# Patient Record
Sex: FEMALE | Race: WHITE | NOT HISPANIC OR LATINO | Employment: OTHER | URBAN - NONMETROPOLITAN AREA
[De-identification: names, ages, dates, MRNs, and addresses within clinical notes are randomized per-mention and may not be internally consistent; named-entity substitution may affect disease eponyms.]

---

## 2017-07-13 ENCOUNTER — TRANSCRIBE ORDERS (OUTPATIENT)
Dept: ADMINISTRATIVE | Facility: HOSPITAL | Age: 77
End: 2017-07-13

## 2017-07-13 DIAGNOSIS — Z12.31 VISIT FOR SCREENING MAMMOGRAM: Primary | ICD-10-CM

## 2017-08-21 ENCOUNTER — HOSPITAL ENCOUNTER (OUTPATIENT)
Dept: MAMMOGRAPHY | Facility: HOSPITAL | Age: 77
Discharge: HOME | End: 2017-08-21
Attending: INTERNAL MEDICINE | Admitting: INTERNAL MEDICINE

## 2017-08-21 DIAGNOSIS — Z12.31 VISIT FOR SCREENING MAMMOGRAM: ICD-10-CM

## 2017-08-21 PROCEDURE — G0202 SCR MAMMO BI INCL CAD: HCPCS | Performed by: RADIOLOGY

## 2017-08-21 PROCEDURE — G0202 SCR MAMMO BI INCL CAD: HCPCS

## 2017-08-21 PROCEDURE — 77063 BREAST TOMOSYNTHESIS BI: CPT | Performed by: RADIOLOGY

## 2017-08-21 PROCEDURE — 77063 BREAST TOMOSYNTHESIS BI: CPT

## 2017-08-31 ENCOUNTER — TRANSCRIBE ORDERS (OUTPATIENT)
Dept: ADMINISTRATIVE | Facility: HOSPITAL | Age: 77
End: 2017-08-31

## 2017-08-31 DIAGNOSIS — M79.604 PAIN AND SWELLING OF RIGHT LOWER EXTREMITY: Primary | ICD-10-CM

## 2017-08-31 DIAGNOSIS — M79.89 PAIN AND SWELLING OF RIGHT LOWER EXTREMITY: Primary | ICD-10-CM

## 2017-09-01 ENCOUNTER — TRANSCRIBE ORDERS (OUTPATIENT)
Dept: ADMINISTRATIVE | Facility: HOSPITAL | Age: 77
End: 2017-09-01

## 2017-09-01 ENCOUNTER — APPOINTMENT (OUTPATIENT)
Dept: CARDIOLOGY | Facility: HOSPITAL | Age: 77
End: 2017-09-01

## 2017-09-01 DIAGNOSIS — R63.4 WEIGHT LOSS, UNINTENTIONAL: Primary | ICD-10-CM

## 2017-09-01 DIAGNOSIS — E03.9 ACQUIRED HYPOTHYROIDISM: ICD-10-CM

## 2017-09-14 ENCOUNTER — HOSPITAL ENCOUNTER (OUTPATIENT)
Dept: ULTRASOUND IMAGING | Facility: HOSPITAL | Age: 77
Discharge: HOME | End: 2017-09-14
Attending: INTERNAL MEDICINE | Admitting: INTERNAL MEDICINE

## 2017-09-14 DIAGNOSIS — E03.9 ACQUIRED HYPOTHYROIDISM: ICD-10-CM

## 2017-09-14 DIAGNOSIS — R63.4 WEIGHT LOSS, UNINTENTIONAL: ICD-10-CM

## 2017-09-14 PROCEDURE — 76536 US EXAM OF HEAD AND NECK: CPT

## 2017-09-14 PROCEDURE — 76536 US EXAM OF HEAD AND NECK: CPT | Performed by: RADIOLOGY

## 2017-11-06 ENCOUNTER — TRANSCRIBE ORDERS (OUTPATIENT)
Dept: ADMINISTRATIVE | Facility: HOSPITAL | Age: 77
End: 2017-11-06

## 2017-11-06 DIAGNOSIS — I82.890 JUGULAR VEIN THROMBOSIS, LEFT: Primary | ICD-10-CM

## 2017-11-06 DIAGNOSIS — I82.890 JUGULAR VEIN THROMBOSIS, LEFT: ICD-10-CM

## 2017-11-06 DIAGNOSIS — R74.8 ELEVATED CREATINE KINASE: Primary | ICD-10-CM

## 2017-11-30 ENCOUNTER — HOSPITAL ENCOUNTER (OUTPATIENT)
Dept: CARDIOLOGY | Facility: HOSPITAL | Age: 77
Discharge: HOME | End: 2017-11-30
Attending: INTERNAL MEDICINE

## 2017-11-30 ENCOUNTER — HOSPITAL ENCOUNTER (OUTPATIENT)
Dept: ULTRASOUND IMAGING | Facility: HOSPITAL | Age: 77
Discharge: HOME | End: 2017-11-30
Attending: INTERNAL MEDICINE | Admitting: INTERNAL MEDICINE

## 2017-11-30 DIAGNOSIS — I82.890 JUGULAR VEIN THROMBOSIS, LEFT: ICD-10-CM

## 2017-11-30 DIAGNOSIS — R74.8 ELEVATED CREATINE KINASE: ICD-10-CM

## 2017-11-30 PROCEDURE — 76770 US EXAM ABDO BACK WALL COMP: CPT | Performed by: RADIOLOGY

## 2017-11-30 PROCEDURE — 93971 EXTREMITY STUDY: CPT | Performed by: RADIOLOGY

## 2017-11-30 PROCEDURE — 93971 EXTREMITY STUDY: CPT

## 2017-11-30 PROCEDURE — 76775 US EXAM ABDO BACK WALL LIM: CPT

## 2017-12-26 ENCOUNTER — TRANSCRIBE ORDERS (OUTPATIENT)
Dept: ADMINISTRATIVE | Facility: HOSPITAL | Age: 77
End: 2017-12-26

## 2017-12-26 DIAGNOSIS — I73.9 INTERMITTENT CLAUDICATION (HCC): Primary | ICD-10-CM

## 2018-01-05 ENCOUNTER — HOSPITAL ENCOUNTER (OUTPATIENT)
Dept: ULTRASOUND IMAGING | Facility: HOSPITAL | Age: 78
Discharge: HOME | End: 2018-01-05
Attending: INTERNAL MEDICINE | Admitting: INTERNAL MEDICINE

## 2018-01-05 DIAGNOSIS — I73.9 INTERMITTENT CLAUDICATION (HCC): ICD-10-CM

## 2018-01-05 PROCEDURE — 93925 LOWER EXTREMITY STUDY: CPT | Performed by: RADIOLOGY

## 2018-01-05 PROCEDURE — 93923 UPR/LXTR ART STDY 3+ LVLS: CPT

## 2018-09-14 ENCOUNTER — HOSPITAL ENCOUNTER (OUTPATIENT)
Dept: MAMMOGRAPHY | Facility: HOSPITAL | Age: 78
Discharge: HOME | End: 2018-09-14
Admitting: INTERNAL MEDICINE

## 2018-09-14 DIAGNOSIS — Z12.39 SCREENING BREAST EXAMINATION: ICD-10-CM

## 2018-09-14 PROCEDURE — 77063 BREAST TOMOSYNTHESIS BI: CPT

## 2018-09-14 PROCEDURE — 77063 BREAST TOMOSYNTHESIS BI: CPT | Performed by: RADIOLOGY

## 2018-09-14 PROCEDURE — 77067 SCR MAMMO BI INCL CAD: CPT | Performed by: RADIOLOGY

## 2018-09-14 PROCEDURE — 77067 SCR MAMMO BI INCL CAD: CPT

## 2019-09-18 ENCOUNTER — HOSPITAL ENCOUNTER (OUTPATIENT)
Dept: MAMMOGRAPHY | Facility: HOSPITAL | Age: 79
Discharge: HOME | End: 2019-09-18
Admitting: INTERNAL MEDICINE

## 2019-09-18 DIAGNOSIS — Z12.39 SCREENING BREAST EXAMINATION: ICD-10-CM

## 2019-09-18 PROCEDURE — 77063 BREAST TOMOSYNTHESIS BI: CPT | Performed by: RADIOLOGY

## 2019-09-18 PROCEDURE — 77067 SCR MAMMO BI INCL CAD: CPT

## 2019-09-18 PROCEDURE — 77063 BREAST TOMOSYNTHESIS BI: CPT

## 2019-09-18 PROCEDURE — 77067 SCR MAMMO BI INCL CAD: CPT | Performed by: RADIOLOGY

## 2020-10-27 ENCOUNTER — HOSPITAL ENCOUNTER (OUTPATIENT)
Dept: MAMMOGRAPHY | Facility: HOSPITAL | Age: 80
Discharge: HOME | End: 2020-10-27
Admitting: INTERNAL MEDICINE

## 2020-10-27 DIAGNOSIS — Z12.31 VISIT FOR SCREENING MAMMOGRAM: ICD-10-CM

## 2020-10-27 PROCEDURE — 77063 BREAST TOMOSYNTHESIS BI: CPT | Performed by: RADIOLOGY

## 2020-10-27 PROCEDURE — 77063 BREAST TOMOSYNTHESIS BI: CPT

## 2020-10-27 PROCEDURE — 77067 SCR MAMMO BI INCL CAD: CPT

## 2020-10-27 PROCEDURE — 77067 SCR MAMMO BI INCL CAD: CPT | Performed by: RADIOLOGY

## 2021-01-11 ENCOUNTER — IMMUNIZATION (OUTPATIENT)
Dept: VACCINE CLINIC | Facility: HOSPITAL | Age: 81
End: 2021-01-11

## 2021-01-11 PROCEDURE — 0001A: CPT | Performed by: FAMILY MEDICINE

## 2021-01-11 PROCEDURE — 91300 HC SARSCOV02 VAC 30MCG/0.3ML IM: CPT | Performed by: FAMILY MEDICINE

## 2021-02-01 ENCOUNTER — IMMUNIZATION (OUTPATIENT)
Dept: VACCINE CLINIC | Facility: HOSPITAL | Age: 81
End: 2021-02-01

## 2021-02-01 PROCEDURE — 91300 HC SARSCOV02 VAC 30MCG/0.3ML IM: CPT | Performed by: FAMILY MEDICINE

## 2021-02-01 PROCEDURE — 0002A: CPT | Performed by: FAMILY MEDICINE

## 2021-06-08 ENCOUNTER — TRANSCRIBE ORDERS (OUTPATIENT)
Dept: ADMINISTRATIVE | Facility: HOSPITAL | Age: 81
End: 2021-06-08

## 2021-06-08 DIAGNOSIS — I73.9 PERIPHERAL VASCULAR DISEASE, UNSPECIFIED (HCC): Primary | ICD-10-CM

## 2021-06-18 ENCOUNTER — HOSPITAL ENCOUNTER (OUTPATIENT)
Dept: CARDIOLOGY | Facility: HOSPITAL | Age: 81
Discharge: HOME | End: 2021-06-18
Admitting: INTERNAL MEDICINE

## 2021-06-18 DIAGNOSIS — I73.9 PERIPHERAL VASCULAR DISEASE, UNSPECIFIED (HCC): ICD-10-CM

## 2021-06-18 PROCEDURE — 93925 LOWER EXTREMITY STUDY: CPT

## 2021-06-18 PROCEDURE — 93925 LOWER EXTREMITY STUDY: CPT | Performed by: RADIOLOGY

## 2021-08-24 ENCOUNTER — OFFICE VISIT (OUTPATIENT)
Dept: CARDIOLOGY | Facility: CLINIC | Age: 81
End: 2021-08-24

## 2021-08-24 VITALS
WEIGHT: 136.8 LBS | HEART RATE: 60 BPM | BODY MASS INDEX: 21.98 KG/M2 | SYSTOLIC BLOOD PRESSURE: 148 MMHG | HEIGHT: 66 IN | DIASTOLIC BLOOD PRESSURE: 68 MMHG | OXYGEN SATURATION: 96 %

## 2021-08-24 DIAGNOSIS — E78.5 DYSLIPIDEMIA: ICD-10-CM

## 2021-08-24 DIAGNOSIS — M79.604 PAIN IN BOTH LOWER EXTREMITIES: Primary | ICD-10-CM

## 2021-08-24 DIAGNOSIS — I25.10 ASCVD (ARTERIOSCLEROTIC CARDIOVASCULAR DISEASE): ICD-10-CM

## 2021-08-24 DIAGNOSIS — M79.605 PAIN IN BOTH LOWER EXTREMITIES: Primary | ICD-10-CM

## 2021-08-24 DIAGNOSIS — I10 ESSENTIAL HYPERTENSION: ICD-10-CM

## 2021-08-24 DIAGNOSIS — I70.212 ATHEROSCLEROSIS OF NATIVE ARTERY OF LEFT LOWER EXTREMITY WITH INTERMITTENT CLAUDICATION (HCC): ICD-10-CM

## 2021-08-24 PROCEDURE — 99204 OFFICE O/P NEW MOD 45 MIN: CPT | Performed by: INTERNAL MEDICINE

## 2021-08-24 RX ORDER — TRAZODONE HYDROCHLORIDE 100 MG/1
100 TABLET ORAL DAILY
COMMUNITY
Start: 2021-06-28

## 2021-08-24 RX ORDER — CILOSTAZOL 50 MG/1
50 TABLET ORAL 2 TIMES DAILY
Qty: 60 TABLET | Refills: 1 | Status: SHIPPED | OUTPATIENT
Start: 2021-08-24 | End: 2021-08-24 | Stop reason: SDUPTHER

## 2021-08-24 RX ORDER — CILOSTAZOL 50 MG/1
50 TABLET ORAL 2 TIMES DAILY
Qty: 180 TABLET | Refills: 3 | Status: SHIPPED | OUTPATIENT
Start: 2021-08-24

## 2021-08-24 RX ORDER — LEVOTHYROXINE SODIUM 88 UG/1
88 TABLET ORAL DAILY
COMMUNITY
Start: 2021-06-22

## 2021-08-24 RX ORDER — PRAVASTATIN SODIUM 80 MG/1
80 TABLET ORAL DAILY
COMMUNITY
Start: 2021-06-28

## 2021-08-24 RX ORDER — ESTRADIOL 0.5 MG/1
0.5 TABLET ORAL DAILY
COMMUNITY
Start: 2014-05-09

## 2021-08-24 RX ORDER — HYDRALAZINE HYDROCHLORIDE 25 MG/1
25 TABLET, FILM COATED ORAL DAILY
COMMUNITY
Start: 2021-06-28

## 2021-08-24 RX ORDER — ASPIRIN 81 MG/1
81 TABLET ORAL DAILY
COMMUNITY

## 2021-08-24 RX ORDER — AMLODIPINE BESYLATE 5 MG/1
5 TABLET ORAL DAILY
COMMUNITY
Start: 2021-06-30

## 2021-08-24 NOTE — PROGRESS NOTES
"Chief Complaint  Consult (leg pain)    Subjective         Pamela Acosta presents to [facility] for consult PAD. Referred by Dr. Anaya  History of Present Illness   Presents with lower extremity pain. No edema noted. Onset was several years ago.  No color changes Intermittent claudication with rest pain bilateral lower extremity. Left worse that the right. Only able to walk about 100 feet then has to take a break before continuing.   No chest pain, shortness of breath, dizziness, or palpitations. She has a past medical history significant for dyslipidemia, hypertension, and hypothyroidism. Smoking: Former smoker. Quit around 3 years ago.     Objective     Vital Signs:   /68 (BP Location: Right arm, Patient Position: Sitting)   Pulse 60   Ht 167.6 cm (66\")   Wt 62.1 kg (136 lb 12.8 oz)   SpO2 96%   BMI 22.08 kg/m²       Physical Exam  Constitutional:       General: She is not in acute distress.     Appearance: Normal appearance.   HENT:      Head: Normocephalic.      Nose: Nose normal.      Mouth/Throat:      Pharynx: Oropharynx is clear.   Eyes:      Pupils: Pupils are equal, round, and reactive to light.   Neck:      Vascular: No carotid bruit.   Cardiovascular:      Rate and Rhythm: Normal rate and regular rhythm.      Pulses: Normal pulses.      Heart sounds: Normal heart sounds. No murmur heard.     Pulmonary:      Effort: Pulmonary effort is normal.      Breath sounds: Normal breath sounds.   Abdominal:      General: Bowel sounds are normal.      Palpations: Abdomen is soft.   Musculoskeletal:      Cervical back: Neck supple.      Right lower leg: No edema.      Left lower leg: No edema.   Skin:     General: Skin is warm.   Neurological:      General: No focal deficit present.      Mental Status: She is alert and oriented to person, place, and time.   Psychiatric:         Mood and Affect: Mood normal.         Behavior: Behavior normal.         Judgment: Judgment " normal.          Result Review :  Data reviewed: Cardiology studies 08/28/2021       Assessment/Plan  Bilateral leg pain  Atherosclerosis of native artery left lower extremity with intermittent claudications  Essential hypertension  Dyslipidemia  ASCVD    AA runoff, Nuclear stress test, echocardiogram.   Peripheral angiogram left 9-8-2021  Pletal 50 mg BID.  Follow up post procedure    I have explained the risks associated with the procedure to the patient including but not limited to an allergic reaction to the contrast material or medications used during the procedure bleeding, infection, and bruising at the catheter insertion site blood clots, which may trigger heart attack, stroke,   damage to the artery where the catheter was inserted, or damage to the arteries as the catheter travels through your body, irregular heart rhythm arrhythmias, kidney damage caused by the contrast material.             Problem List Items Addressed This Visit     None      Visit Diagnoses     Pain in both lower extremities    -  Primary    Relevant Orders    CT Angio Abdominal Aorta Bilateral Iliofem Runoff    US Ankle / Brachial Indices Extremity Limited    Adult Transthoracic Echo Complete W/ Cont if Necessary Per Protocol    Stress Test With Myocardial Perfusion One Day    Case Request IR Lab: Peripheral angiography    CBC & Differential    Comprehensive Metabolic Panel    Atherosclerosis of native artery of left lower extremity with intermittent claudication (CMS/HCC)        Relevant Orders    CT Angio Abdominal Aorta Bilateral Iliofem Runoff    US Ankle / Brachial Indices Extremity Limited    Adult Transthoracic Echo Complete W/ Cont if Necessary Per Protocol    Stress Test With Myocardial Perfusion One Day    Case Request IR Lab: Peripheral angiography    CBC & Differential    Comprehensive Metabolic Panel    ASCVD (arteriosclerotic cardiovascular disease)        Relevant Orders    CT Angio Abdominal Aorta Bilateral Iliofem  Runoff    US Ankle / Brachial Indices Extremity Limited    Adult Transthoracic Echo Complete W/ Cont if Necessary Per Protocol    Stress Test With Myocardial Perfusion One Day    Case Request IR Lab: Peripheral angiography    CBC & Differential    Comprehensive Metabolic Panel    Essential hypertension        Relevant Medications    amLODIPine (NORVASC) 5 MG tablet    hydrALAZINE (APRESOLINE) 25 MG tablet    Other Relevant Orders    CT Angio Abdominal Aorta Bilateral Iliofem Runoff    US Ankle / Brachial Indices Extremity Limited    Adult Transthoracic Echo Complete W/ Cont if Necessary Per Protocol    Stress Test With Myocardial Perfusion One Day    Case Request IR Lab: Peripheral angiography    CBC & Differential    Comprehensive Metabolic Panel    Dyslipidemia                Follow Up     Return for Post procedure.  Patient was given instructions and counseling regarding her condition or for health maintenance advice. Please see specific information pulled into the AVS if appropriate.               Sandip Morris MD, FACC  Interventional Cardiology    TICO Tabares, acting as scribe for Sandip Morris MD, [facility]    08/24/21  17:54 EDT

## 2021-08-25 ENCOUNTER — TRANSCRIBE ORDERS (OUTPATIENT)
Dept: ADMINISTRATIVE | Facility: HOSPITAL | Age: 81
End: 2021-08-25

## 2021-08-25 DIAGNOSIS — Z01.818 OTHER SPECIFIED PRE-OPERATIVE EXAMINATION: Primary | ICD-10-CM

## 2021-08-25 PROBLEM — I10 ESSENTIAL HYPERTENSION: Status: ACTIVE | Noted: 2021-08-25

## 2021-08-25 PROBLEM — I25.10 ASCVD (ARTERIOSCLEROTIC CARDIOVASCULAR DISEASE): Status: ACTIVE | Noted: 2021-08-25

## 2021-08-25 PROBLEM — M79.605 PAIN IN BOTH LOWER EXTREMITIES: Status: ACTIVE | Noted: 2021-08-25

## 2021-08-25 PROBLEM — I70.212 ATHEROSCLEROSIS OF NATIVE ARTERY OF LEFT LOWER EXTREMITY WITH INTERMITTENT CLAUDICATION (HCC): Status: ACTIVE | Noted: 2021-08-25

## 2021-08-25 PROBLEM — M79.604 PAIN IN BOTH LOWER EXTREMITIES: Status: ACTIVE | Noted: 2021-08-25

## 2021-08-26 ENCOUNTER — TELEPHONE (OUTPATIENT)
Dept: CARDIOLOGY | Facility: CLINIC | Age: 81
End: 2021-08-26

## 2021-08-26 NOTE — TELEPHONE ENCOUNTER
Spoke with patient.  She wants to hold off for now with peripheral angiogram due to the COVID. Will let us know when she is ready to schedule

## 2021-10-18 ENCOUNTER — HOSPITAL ENCOUNTER (OUTPATIENT)
Dept: NUCLEAR MEDICINE | Facility: HOSPITAL | Age: 81
Discharge: HOME | End: 2021-10-18

## 2021-10-18 ENCOUNTER — HOSPITAL ENCOUNTER (OUTPATIENT)
Dept: CARDIOLOGY | Facility: HOSPITAL | Age: 81
Discharge: HOME | End: 2021-10-18

## 2021-10-18 DIAGNOSIS — I10 ESSENTIAL HYPERTENSION: ICD-10-CM

## 2021-10-18 DIAGNOSIS — M79.605 PAIN IN BOTH LOWER EXTREMITIES: ICD-10-CM

## 2021-10-18 DIAGNOSIS — I70.212 ATHEROSCLEROSIS OF NATIVE ARTERY OF LEFT LOWER EXTREMITY WITH INTERMITTENT CLAUDICATION (HCC): ICD-10-CM

## 2021-10-18 DIAGNOSIS — M79.604 PAIN IN BOTH LOWER EXTREMITIES: ICD-10-CM

## 2021-10-18 DIAGNOSIS — I25.10 ASCVD (ARTERIOSCLEROTIC CARDIOVASCULAR DISEASE): ICD-10-CM

## 2021-10-18 PROCEDURE — A9500 TC99M SESTAMIBI: HCPCS | Performed by: INTERNAL MEDICINE

## 2021-10-18 PROCEDURE — 25010000002 REGADENOSON 0.4 MG/5ML SOLUTION: Performed by: INTERNAL MEDICINE

## 2021-10-18 PROCEDURE — 93306 TTE W/DOPPLER COMPLETE: CPT | Performed by: INTERNAL MEDICINE

## 2021-10-18 PROCEDURE — 93306 TTE W/DOPPLER COMPLETE: CPT

## 2021-10-18 PROCEDURE — 0 TECHNETIUM SESTAMIBI: Performed by: INTERNAL MEDICINE

## 2021-10-18 PROCEDURE — 93018 CV STRESS TEST I&R ONLY: CPT | Performed by: INTERNAL MEDICINE

## 2021-10-18 PROCEDURE — 93017 CV STRESS TEST TRACING ONLY: CPT

## 2021-10-18 PROCEDURE — 78452 HT MUSCLE IMAGE SPECT MULT: CPT

## 2021-10-18 PROCEDURE — 78452 HT MUSCLE IMAGE SPECT MULT: CPT | Performed by: INTERNAL MEDICINE

## 2021-10-18 RX ADMIN — TECHNETIUM TC 99M SESTAMIBI 1 DOSE: 1 INJECTION INTRAVENOUS at 15:58

## 2021-10-18 RX ADMIN — REGADENOSON 0.4 MG: 0.08 INJECTION, SOLUTION INTRAVENOUS at 15:58

## 2021-10-18 RX ADMIN — TECHNETIUM TC 99M SESTAMIBI 1 DOSE: 1 INJECTION INTRAVENOUS at 14:00

## 2021-10-19 LAB
BH CV ECHO MEAS - ACS: 1.7 CM
BH CV ECHO MEAS - AO MAX PG (FULL): 3.5 MMHG
BH CV ECHO MEAS - AO MAX PG: 8.1 MMHG
BH CV ECHO MEAS - AO MEAN PG (FULL): 2 MMHG
BH CV ECHO MEAS - AO MEAN PG: 4 MMHG
BH CV ECHO MEAS - AO ROOT AREA (BSA CORRECTED): 1.7
BH CV ECHO MEAS - AO ROOT AREA: 6.6 CM^2
BH CV ECHO MEAS - AO ROOT DIAM: 2.9 CM
BH CV ECHO MEAS - AO V2 MAX: 142 CM/SEC
BH CV ECHO MEAS - AO V2 MEAN: 90.3 CM/SEC
BH CV ECHO MEAS - AO V2 VTI: 33.6 CM
BH CV ECHO MEAS - ASC AORTA: 3.1 CM
BH CV ECHO MEAS - AVA(I,A): 1.7 CM^2
BH CV ECHO MEAS - AVA(I,D): 1.7 CM^2
BH CV ECHO MEAS - AVA(V,A): 1.9 CM^2
BH CV ECHO MEAS - AVA(V,D): 1.9 CM^2
BH CV ECHO MEAS - BSA(HAYCOCK): 1.7 M^2
BH CV ECHO MEAS - BSA: 1.7 M^2
BH CV ECHO MEAS - BZI_BMI: 22 KILOGRAMS/M^2
BH CV ECHO MEAS - BZI_METRIC_HEIGHT: 167.6 CM
BH CV ECHO MEAS - BZI_METRIC_WEIGHT: 61.7 KG
BH CV ECHO MEAS - EDV(CUBED): 64 ML
BH CV ECHO MEAS - EDV(MOD-SP4): 54.6 ML
BH CV ECHO MEAS - EDV(TEICH): 70 ML
BH CV ECHO MEAS - EF(CUBED): 83.4 %
BH CV ECHO MEAS - EF(MOD-SP4): 71.2 %
BH CV ECHO MEAS - EF(TEICH): 76.9 %
BH CV ECHO MEAS - ESV(CUBED): 10.6 ML
BH CV ECHO MEAS - ESV(MOD-SP4): 15.7 ML
BH CV ECHO MEAS - ESV(TEICH): 16.2 ML
BH CV ECHO MEAS - FS: 45 %
BH CV ECHO MEAS - IVS/LVPW: 1.1
BH CV ECHO MEAS - IVSD: 1 CM
BH CV ECHO MEAS - LA DIMENSION: 3.2 CM
BH CV ECHO MEAS - LA/AO: 1.1
BH CV ECHO MEAS - LV DIASTOLIC VOL/BSA (35-75): 32.2 ML/M^2
BH CV ECHO MEAS - LV MASS(C)D: 118.2 GRAMS
BH CV ECHO MEAS - LV MASS(C)DI: 69.6 GRAMS/M^2
BH CV ECHO MEAS - LV MAX PG: 4.6 MMHG
BH CV ECHO MEAS - LV MEAN PG: 2 MMHG
BH CV ECHO MEAS - LV SYSTOLIC VOL/BSA (12-30): 9.2 ML/M^2
BH CV ECHO MEAS - LV V1 MAX: 107 CM/SEC
BH CV ECHO MEAS - LV V1 MEAN: 64.5 CM/SEC
BH CV ECHO MEAS - LV V1 VTI: 22.9 CM
BH CV ECHO MEAS - LVIDD: 4 CM
BH CV ECHO MEAS - LVIDS: 2.2 CM
BH CV ECHO MEAS - LVLD AP4: 6.5 CM
BH CV ECHO MEAS - LVLS AP4: 5.1 CM
BH CV ECHO MEAS - LVOT AREA (M): 2.5 CM^2
BH CV ECHO MEAS - LVOT AREA: 2.5 CM^2
BH CV ECHO MEAS - LVOT DIAM: 1.8 CM
BH CV ECHO MEAS - LVPWD: 0.9 CM
BH CV ECHO MEAS - MV A MAX VEL: 89.7 CM/SEC
BH CV ECHO MEAS - MV DEC SLOPE: 465 CM/SEC^2
BH CV ECHO MEAS - MV E MAX VEL: 110 CM/SEC
BH CV ECHO MEAS - MV E/A: 1.2
BH CV ECHO MEAS - MV P1/2T MAX VEL: 110 CM/SEC
BH CV ECHO MEAS - MV P1/2T: 69.3 MSEC
BH CV ECHO MEAS - MVA P1/2T LCG: 2 CM^2
BH CV ECHO MEAS - MVA(P1/2T): 3.2 CM^2
BH CV ECHO MEAS - PA ACC TIME: 0.14 SEC
BH CV ECHO MEAS - PA PR(ACCEL): 17.4 MMHG
BH CV ECHO MEAS - SI(AO): 130.7 ML/M^2
BH CV ECHO MEAS - SI(CUBED): 31.4 ML/M^2
BH CV ECHO MEAS - SI(LVOT): 34.3 ML/M^2
BH CV ECHO MEAS - SI(MOD-SP4): 22.9 ML/M^2
BH CV ECHO MEAS - SI(TEICH): 31.7 ML/M^2
BH CV ECHO MEAS - SV(AO): 221.9 ML
BH CV ECHO MEAS - SV(CUBED): 53.4 ML
BH CV ECHO MEAS - SV(LVOT): 58.3 ML
BH CV ECHO MEAS - SV(MOD-SP4): 38.9 ML
BH CV ECHO MEAS - SV(TEICH): 53.8 ML
BH CV ECHO MEAS - TR MAX VEL: 241 CM/SEC
BH CV NUCLEAR PRIOR STUDY: 3
BH CV REST NUCLEAR ISOTOPE DOSE: 9.1 MCI
BH CV STRESS BP STAGE 1: NORMAL
BH CV STRESS BP STAGE 2: NORMAL
BH CV STRESS COMMENTS STAGE 1: NORMAL
BH CV STRESS COMMENTS STAGE 2: NORMAL
BH CV STRESS DOSE REGADENOSON STAGE 1: 0.4
BH CV STRESS DURATION MIN STAGE 1: 0
BH CV STRESS DURATION MIN STAGE 2: 4
BH CV STRESS DURATION SEC STAGE 1: 10
BH CV STRESS DURATION SEC STAGE 2: 0
BH CV STRESS HR STAGE 1: 75
BH CV STRESS HR STAGE 2: 83
BH CV STRESS NUCLEAR ISOTOPE DOSE: 27.2 MCI
BH CV STRESS PROTOCOL 1: NORMAL
BH CV STRESS RECOVERY BP: NORMAL MMHG
BH CV STRESS RECOVERY HR: 74 BPM
BH CV STRESS STAGE 1: 1
BH CV STRESS STAGE 2: 2
MAXIMAL PREDICTED HEART RATE: 141 BPM
MAXIMAL PREDICTED HEART RATE: 141 BPM
PERCENT MAX PREDICTED HR: 58.87 %
STRESS BASELINE BP: NORMAL MMHG
STRESS BASELINE HR: 67 BPM
STRESS PERCENT HR: 69 %
STRESS POST PEAK BP: NORMAL MMHG
STRESS POST PEAK HR: 83 BPM
STRESS TARGET HR: 120 BPM
STRESS TARGET HR: 120 BPM

## 2021-10-21 ENCOUNTER — TELEPHONE (OUTPATIENT)
Dept: CARDIOLOGY | Facility: CLINIC | Age: 81
End: 2021-10-21

## 2021-10-21 NOTE — TELEPHONE ENCOUNTER
"Spoke with patient notified that echocardiogram was normal.  Patient doesn't want to proceed with peripheral angiogram due to kidney function. Her PCP told her to never have any \"dye studies\"    Will call her back with stress test results once they are available.   "

## 2021-10-25 ENCOUNTER — APPOINTMENT (OUTPATIENT)
Dept: CT IMAGING | Facility: HOSPITAL | Age: 81
End: 2021-10-25

## 2021-10-25 ENCOUNTER — APPOINTMENT (OUTPATIENT)
Dept: ULTRASOUND IMAGING | Facility: HOSPITAL | Age: 81
End: 2021-10-25

## 2021-11-01 ENCOUNTER — IMMUNIZATION (OUTPATIENT)
Dept: VACCINE CLINIC | Facility: HOSPITAL | Age: 81
End: 2021-11-01

## 2021-11-01 PROCEDURE — 0004A ADM SARSCOV2 30MCG/0.3ML BOOSTER: CPT | Performed by: INTERNAL MEDICINE

## 2021-11-01 PROCEDURE — 91300 HC SARSCOV02 VAC 30MCG/0.3ML IM: CPT | Performed by: INTERNAL MEDICINE

## 2021-11-15 ENCOUNTER — HOSPITAL ENCOUNTER (OUTPATIENT)
Dept: MAMMOGRAPHY | Facility: HOSPITAL | Age: 81
Discharge: HOME | End: 2021-11-15
Admitting: INTERNAL MEDICINE

## 2021-11-15 DIAGNOSIS — Z12.31 VISIT FOR SCREENING MAMMOGRAM: ICD-10-CM

## 2021-11-15 PROCEDURE — 77063 BREAST TOMOSYNTHESIS BI: CPT

## 2021-11-15 PROCEDURE — 77063 BREAST TOMOSYNTHESIS BI: CPT | Performed by: RADIOLOGY

## 2021-11-15 PROCEDURE — 77067 SCR MAMMO BI INCL CAD: CPT

## 2021-11-15 PROCEDURE — 77067 SCR MAMMO BI INCL CAD: CPT | Performed by: RADIOLOGY

## 2021-12-15 ENCOUNTER — OFFICE VISIT (OUTPATIENT)
Dept: CARDIOLOGY | Facility: CLINIC | Age: 81
End: 2021-12-15

## 2021-12-15 VITALS
OXYGEN SATURATION: 95 % | HEART RATE: 74 BPM | BODY MASS INDEX: 21.86 KG/M2 | DIASTOLIC BLOOD PRESSURE: 75 MMHG | SYSTOLIC BLOOD PRESSURE: 126 MMHG | HEIGHT: 66 IN | WEIGHT: 136 LBS

## 2021-12-15 DIAGNOSIS — I73.9 PERIPHERAL ARTERY DISEASE: Primary | ICD-10-CM

## 2021-12-15 PROCEDURE — 99214 OFFICE O/P EST MOD 30 MIN: CPT | Performed by: INTERNAL MEDICINE

## 2022-01-03 ENCOUNTER — APPOINTMENT (OUTPATIENT)
Dept: GENERAL RADIOLOGY | Facility: HOSPITAL | Age: 82
End: 2022-01-03

## 2022-01-10 ENCOUNTER — HOSPITAL ENCOUNTER (OUTPATIENT)
Dept: ULTRASOUND IMAGING | Facility: HOSPITAL | Age: 82
Discharge: HOME | End: 2022-01-10
Admitting: INTERNAL MEDICINE

## 2022-01-10 PROCEDURE — 93922 UPR/L XTREMITY ART 2 LEVELS: CPT

## 2022-01-10 PROCEDURE — 93922 UPR/L XTREMITY ART 2 LEVELS: CPT | Performed by: RADIOLOGY

## 2022-01-21 ENCOUNTER — TELEPHONE (OUTPATIENT)
Dept: CARDIOLOGY | Facility: CLINIC | Age: 82
End: 2022-01-21

## 2022-01-21 NOTE — TELEPHONE ENCOUNTER
----- Message from Sandip Morris MD sent at 1/11/2022  9:40 AM EST -----  Please call the patient and tell her TYLER was mildly lower on R side and I will discuss more during next visit, if she is not having worsening pain on R leg there is no urgent need for procedure now

## 2022-06-28 ENCOUNTER — OFFICE VISIT (OUTPATIENT)
Dept: CARDIOLOGY | Facility: CLINIC | Age: 82
End: 2022-06-28

## 2022-06-28 VITALS
DIASTOLIC BLOOD PRESSURE: 67 MMHG | WEIGHT: 134.6 LBS | OXYGEN SATURATION: 97 % | SYSTOLIC BLOOD PRESSURE: 139 MMHG | BODY MASS INDEX: 21.63 KG/M2 | HEIGHT: 66 IN | HEART RATE: 64 BPM

## 2022-06-28 DIAGNOSIS — I10 ESSENTIAL HYPERTENSION: ICD-10-CM

## 2022-06-28 DIAGNOSIS — I25.10 ASCVD (ARTERIOSCLEROTIC CARDIOVASCULAR DISEASE): Primary | ICD-10-CM

## 2022-06-28 PROCEDURE — 99214 OFFICE O/P EST MOD 30 MIN: CPT | Performed by: INTERNAL MEDICINE

## 2022-10-13 ENCOUNTER — TRANSCRIBE ORDERS (OUTPATIENT)
Dept: ADMINISTRATIVE | Facility: HOSPITAL | Age: 82
End: 2022-10-13

## 2022-10-13 ENCOUNTER — HOSPITAL ENCOUNTER (OUTPATIENT)
Dept: GENERAL RADIOLOGY | Facility: HOSPITAL | Age: 82
Discharge: HOME | End: 2022-10-13
Admitting: INTERNAL MEDICINE

## 2022-10-13 DIAGNOSIS — J20.9 ACUTE BRONCHITIS, UNSPECIFIED ORGANISM: Primary | ICD-10-CM

## 2022-10-13 DIAGNOSIS — J20.9 ACUTE BRONCHITIS, UNSPECIFIED ORGANISM: ICD-10-CM

## 2022-10-13 PROCEDURE — 71046 X-RAY EXAM CHEST 2 VIEWS: CPT

## 2022-10-13 PROCEDURE — 71046 X-RAY EXAM CHEST 2 VIEWS: CPT | Performed by: RADIOLOGY

## 2022-11-18 ENCOUNTER — APPOINTMENT (OUTPATIENT)
Dept: MAMMOGRAPHY | Facility: HOSPITAL | Age: 82
End: 2022-11-18

## 2022-11-28 ENCOUNTER — HOSPITAL ENCOUNTER (OUTPATIENT)
Dept: MAMMOGRAPHY | Facility: HOSPITAL | Age: 82
Discharge: HOME | End: 2022-11-28
Admitting: INTERNAL MEDICINE

## 2022-11-28 DIAGNOSIS — Z12.31 VISIT FOR SCREENING MAMMOGRAM: ICD-10-CM

## 2022-11-28 PROCEDURE — 77063 BREAST TOMOSYNTHESIS BI: CPT | Performed by: RADIOLOGY

## 2022-11-28 PROCEDURE — 77063 BREAST TOMOSYNTHESIS BI: CPT

## 2022-11-28 PROCEDURE — 77067 SCR MAMMO BI INCL CAD: CPT

## 2022-11-28 PROCEDURE — 77067 SCR MAMMO BI INCL CAD: CPT | Performed by: RADIOLOGY

## 2023-01-04 ENCOUNTER — HOSPITAL ENCOUNTER (OUTPATIENT)
Dept: MAMMOGRAPHY | Facility: HOSPITAL | Age: 83
Discharge: HOME | End: 2023-01-04
Payer: MEDICARE

## 2023-01-04 ENCOUNTER — HOSPITAL ENCOUNTER (OUTPATIENT)
Dept: ULTRASOUND IMAGING | Facility: HOSPITAL | Age: 83
Discharge: HOME | End: 2023-01-04
Payer: MEDICARE

## 2023-01-04 DIAGNOSIS — R92.8 ABNORMAL MAMMOGRAM: ICD-10-CM

## 2023-01-04 PROCEDURE — 76642 ULTRASOUND BREAST LIMITED: CPT

## 2023-01-04 PROCEDURE — 76642 ULTRASOUND BREAST LIMITED: CPT | Performed by: RADIOLOGY

## 2023-04-10 ENCOUNTER — TRANSCRIBE ORDERS (OUTPATIENT)
Dept: ADMINISTRATIVE | Facility: HOSPITAL | Age: 83
End: 2023-04-10
Payer: MEDICARE

## 2023-04-10 ENCOUNTER — HOSPITAL ENCOUNTER (OUTPATIENT)
Dept: GENERAL RADIOLOGY | Facility: HOSPITAL | Age: 83
Discharge: HOME | End: 2023-04-10
Admitting: INTERNAL MEDICINE
Payer: MEDICARE

## 2023-04-10 DIAGNOSIS — M54.2 NECK PAIN: Primary | ICD-10-CM

## 2023-04-10 DIAGNOSIS — M54.2 NECK PAIN: ICD-10-CM

## 2023-04-10 PROCEDURE — 72050 X-RAY EXAM NECK SPINE 4/5VWS: CPT

## 2023-04-10 PROCEDURE — 72050 X-RAY EXAM NECK SPINE 4/5VWS: CPT | Performed by: RADIOLOGY

## 2023-04-13 ENCOUNTER — TRANSCRIBE ORDERS (OUTPATIENT)
Dept: ADMINISTRATIVE | Facility: HOSPITAL | Age: 83
End: 2023-04-13
Payer: MEDICARE

## 2023-04-13 DIAGNOSIS — R05.3 CHRONIC COUGH: Primary | ICD-10-CM

## 2023-08-14 ENCOUNTER — HOSPITAL ENCOUNTER (OUTPATIENT)
Dept: CT IMAGING | Facility: HOSPITAL | Age: 83
Discharge: HOME | End: 2023-08-14
Admitting: INTERNAL MEDICINE
Payer: MEDICARE

## 2023-08-14 ENCOUNTER — TRANSCRIBE ORDERS (OUTPATIENT)
Dept: ADMINISTRATIVE | Facility: HOSPITAL | Age: 83
End: 2023-08-14
Payer: MEDICARE

## 2023-08-14 DIAGNOSIS — R31.0 GROSS HEMATURIA: Primary | ICD-10-CM

## 2023-08-14 DIAGNOSIS — R31.0 GROSS HEMATURIA: ICD-10-CM

## 2023-08-14 PROCEDURE — 74176 CT ABD & PELVIS W/O CONTRAST: CPT

## 2023-08-16 ENCOUNTER — OFFICE VISIT (OUTPATIENT)
Dept: UROLOGY | Facility: CLINIC | Age: 83
End: 2023-08-16
Payer: MEDICARE

## 2023-08-16 VITALS
DIASTOLIC BLOOD PRESSURE: 63 MMHG | WEIGHT: 123 LBS | BODY MASS INDEX: 19.77 KG/M2 | HEART RATE: 78 BPM | HEIGHT: 66 IN | SYSTOLIC BLOOD PRESSURE: 107 MMHG

## 2023-08-16 DIAGNOSIS — N32.89 BLADDER WALL THICKENING: ICD-10-CM

## 2023-08-16 DIAGNOSIS — N20.0 BILATERAL KIDNEY STONES: Primary | ICD-10-CM

## 2023-08-16 DIAGNOSIS — Q62.11 HYDRONEPHROSIS WITH URETEROPELVIC JUNCTION (UPJ) OBSTRUCTION: ICD-10-CM

## 2023-08-16 DIAGNOSIS — N26.1 ATROPHY OF LEFT KIDNEY: ICD-10-CM

## 2023-08-16 RX ORDER — TAMSULOSIN HYDROCHLORIDE 0.4 MG/1
1 CAPSULE ORAL NIGHTLY
Qty: 30 CAPSULE | Refills: 1 | Status: SHIPPED | OUTPATIENT
Start: 2023-08-16

## 2023-08-16 RX ORDER — DOXYCYCLINE 100 MG/1
100 CAPSULE ORAL
COMMUNITY
Start: 2023-08-14

## 2023-08-16 NOTE — PROGRESS NOTES
"Chief Complaint:    Chief Complaint   Patient presents with    Nephrolithiasis       Vital Signs:   /63 (BP Location: Right arm, Patient Position: Sitting)   Pulse 78   Ht 167.6 cm (66\")   Wt 55.8 kg (123 lb)   BMI 19.85 kg/mý   Body mass index is 19.85 kg/mý.      HPI:  Pamela Acosta is a 83 y.o. female who presents today for initial evaluation     History of Present Illness  Ms. Acosta presents to the clinic for an initial evaluation of nephrolithiasis and hydronephrosis.  She has been referred to us by her primary care provider Dr. Sandy Anaya MD.  She has a past medical history significant for hypertension, kidney stone, and chronic kidney disease.  Patient was last seen in office in 2015 by Dr. Preciado for a left-sided 1 cm intrarenal calculus causing some moderate obstructive uropathy.  She underwent a left ESWL with double-J stent placement.  She had good fragmentation and the stent was removed in office with no problems.  She denies any significant pain until recently.  She went to her primary care provider he was started on doxycycline due to concerns of possible UTI.  She had a urine analysis completed at that primary care provider's visit that showed just blood.  She was then scheduled to undergo a CT of the abdomen and pelvis with stone protocol on 8/14/2023.  CT report showed bilateral nephrolithiasis.  She had roughly 3-4 kidney stones in each kidney and her largest stone in both kidneys measured roughly 3 mm in size.  Her left kidney had significant atrophy noted with hydronephrosis and what appeared to be chronic UPJ stenosis/obstruction.  There is no distal ureteral swelling noted.  She did have some bladder wall thickening however this was uniform throughout.  She denies any gross blood.  She does report some intermittent back and left-sided flank pain that has improved over the past 2 days.  She was unable to urinate in office.    Past Medical History:  Past Medical History: "   Diagnosis Date    Arthritis     Frequent headaches     Hyperlipidemia     Hypertension     Myocardial infarction     Thyroid disease        Current Meds:  Current Outpatient Medications   Medication Sig Dispense Refill    amLODIPine (NORVASC) 5 MG tablet Take 1 tablet by mouth Daily.      aspirin 81 MG EC tablet Take 1 tablet by mouth Daily.      cilostazol (PLETAL) 50 MG tablet Take 1 tablet by mouth 2 (Two) Times a Day. 180 tablet 3    doxycycline (MONODOX) 100 MG capsule 1 capsule.      estradiol (ESTRACE) 0.5 MG tablet Take 1 tablet by mouth Daily.      hydrALAZINE (APRESOLINE) 25 MG tablet Take 1 tablet by mouth Daily.      levothyroxine (SYNTHROID, LEVOTHROID) 88 MCG tablet Take 1 tablet by mouth Daily.      pravastatin (PRAVACHOL) 80 MG tablet Take 1 tablet by mouth Daily.      traZODone (DESYREL) 100 MG tablet Take 1 tablet by mouth Daily.      tamsulosin (Flomax) 0.4 MG capsule 24 hr capsule Take 1 capsule by mouth Every Night. 30 capsule 1     No current facility-administered medications for this visit.        Allergies:   No Known Allergies     Past Surgical History:  Past Surgical History:   Procedure Laterality Date    BREAST BIOPSY Right 1962    benign    HYSTERECTOMY  1962    benign       Social History:  Social History     Socioeconomic History    Marital status:    Tobacco Use    Smoking status: Former   Vaping Use    Vaping Use: Never used   Substance and Sexual Activity    Alcohol use: Yes     Alcohol/week: 3.0 - 6.0 standard drinks     Types: 1 - 2 Glasses of wine, 1 - 2 Cans of beer, 1 - 2 Shots of liquor per week     Comment: OCCASIONALLY    Drug use: Never    Sexual activity: Never       Family History:  Family History   Problem Relation Age of Onset    Heart disease Father     Heart attack Father     Heart disease Brother     Breast cancer Neg Hx        Review of Systems:  Review of Systems   Constitutional:  Negative for chills, fatigue, fever and unexpected weight change.    Respiratory:  Positive for shortness of breath. Negative for cough, chest tightness and wheezing.    Cardiovascular:  Negative for chest pain and leg swelling.   Gastrointestinal:  Negative for abdominal pain, constipation, diarrhea, nausea and vomiting.   Genitourinary:  Positive for dysuria, frequency and urgency. Negative for difficulty urinating and pelvic pain.   Musculoskeletal:  Positive for back pain. Negative for joint swelling.   Skin:  Negative for rash.   Neurological:  Negative for dizziness and headaches.   Psychiatric/Behavioral:  Negative for confusion and suicidal ideas.      Physical Exam:  Physical Exam  Constitutional:       General: She is not in acute distress.     Appearance: Normal appearance.   HENT:      Head: Normocephalic and atraumatic.      Nose: Nose normal.      Mouth/Throat:      Mouth: Mucous membranes are moist.   Eyes:      Conjunctiva/sclera: Conjunctivae normal.   Cardiovascular:      Rate and Rhythm: Normal rate and regular rhythm.      Pulses: Normal pulses.      Heart sounds: Normal heart sounds.   Pulmonary:      Effort: Pulmonary effort is normal.      Breath sounds: Normal breath sounds.   Abdominal:      General: Bowel sounds are normal.      Palpations: Abdomen is soft.      Tenderness: There is no right CVA tenderness or left CVA tenderness.   Musculoskeletal:         General: Normal range of motion.      Cervical back: Normal range of motion.   Skin:     General: Skin is warm.   Neurological:      General: No focal deficit present.      Mental Status: She is alert and oriented to person, place, and time.   Psychiatric:         Mood and Affect: Mood normal.         Behavior: Behavior normal.         Thought Content: Thought content normal.         Judgment: Judgment normal.                Recent Image (CT and/or KUB):   CT Abdomen and Pelvis: No results found for this or any previous visit.     CT Stone Protocol: Results for orders placed during the hospital encounter  of 08/14/23    CT Abdomen Pelvis Stone Protocol    Narrative  EXAM:  CT Abdomen and Pelvis Without Intravenous Contrast    EXAM DATE:  8/14/2023 2:16 PM    CLINICAL HISTORY:  R31.0; R31.0-Gross hematuria    TECHNIQUE:  Axial computed tomography images of the abdomen and pelvis without  intravenous contrast.  Sagittal and coronal reformatted images were  created and reviewed.  This CT exam was performed using one or more of  the following dose reduction techniques:  automated exposure control,  adjustment of the mA and/or kV according to patient size, and/or use of  iterative reconstruction technique.    COMPARISON:  05/14/2015    FINDINGS:  Lung bases:  Lung bases are clear.    ABDOMEN:  Liver:  Stable low-attenuation right lobe liver lesion most consistent  with benign process such as hemangioma and is 1.3 cm and was previously  1.5 cm.  Gallbladder and bile ducts:  Unremarkable.  No calcified stones.  No  ductal dilation.  Pancreas:  Unremarkable.  No ductal dilation.  Spleen:  Unremarkable.  No splenomegaly.  Adrenals:  Unremarkable.  No mass.  Kidneys and ureters:  Chronic dilatation of the left renal collecting  system with no significant ureteral dilatation identified.  Atrophy left  kidney noted.  Nonobstructing left kidney stone.  Nonobstructing right  kidney stones.  Compensatory hypertrophy of the right kidney is noted.  Stomach and bowel:  There is marked sigmoid diverticulosis but no CT  evidence of acute diverticulitis.  No obstruction.    PELVIS:  Appendix:  Appendix not visualized.  Bladder:  Mild urinary bladder wall thickening.  Incomplete distention  of urinary bladder.  No stones.  Reproductive:  Hysterectomy noted.    ABDOMEN and PELVIS:  Intraperitoneal space:  Unremarkable.  No significant fluid  collection.  No pneumoperitoneum identified.  Bones/joints:  Stable appearance of the bony structures.  Degenerative  changes lumbar spine with multilevel stenosis.  No dislocation.  No  acute fracture  identified.  Soft tissues:  Unremarkable.  Vasculature:  Atherosclerosis aortoiliac vasculature. No aneurysm.  Lymph nodes:  Unremarkable.  No enlarged lymph nodes.    Impression  1.  Bilateral nonobstructing kidney stones.  2.  Chronic appearing atrophy left kidney with probable chronic  obstructive uropathy noted to the level of the UPJ which may be due to  UPJ stenosis/stricture.  3.  Urinary bladder wall thickening which may be in part related to  incomplete distention. Cystitis not excluded.  4.  Diverticulosis without diverticulitis.  5.  Other incidental/nonacute findings detailed above.    This report was finalized on 8/14/2023 4:52 PM by Dr. Aman Abreu MD.     KUB: No results found for this or any previous visit.       Labs:  Brief Urine Lab Results       None          No visits with results within 3 Month(s) from this visit.   Latest known visit with results is:   Hospital Outpatient Visit on 10/18/2021   Component Date Value Ref Range Status    BSA 10/18/2021 1.7  m^2 Final    IVSd 10/18/2021 1.0  cm Final    LVIDd 10/18/2021 4.0  cm Final    LVIDs 10/18/2021 2.2  cm Final    LVPWd 10/18/2021 0.9  cm Final    IVS/LVPW 10/18/2021 1.1   Final    FS 10/18/2021 45.0  % Final    EDV(Teich) 10/18/2021 70.0  ml Final    ESV(Teich) 10/18/2021 16.2  ml Final    EF(Teich) 10/18/2021 76.9  % Final    EDV(cubed) 10/18/2021 64.0  ml Final    ESV(cubed) 10/18/2021 10.6  ml Final    EF(cubed) 10/18/2021 83.4  % Final    LV mass(C)d 10/18/2021 118.2  grams Final    LV mass(C)dI 10/18/2021 69.6  grams/m^2 Final    SV(Teich) 10/18/2021 53.8  ml Final    SI(Teich) 10/18/2021 31.7  ml/m^2 Final    SV(cubed) 10/18/2021 53.4  ml Final    SI(cubed) 10/18/2021 31.4  ml/m^2 Final    Ao root diam 10/18/2021 2.9  cm Final    Ao root area 10/18/2021 6.6  cm^2 Final    ACS 10/18/2021 1.7  cm Final    LA dimension (2D)  10/18/2021 3.2  cm Final    asc Aorta Diam 10/18/2021 3.1  cm Final    LA/Ao 10/18/2021 1.1   Final    LVOT diam  10/18/2021 1.8  cm Final    LVOT area 10/18/2021 2.5  cm^2 Final    LVOT area(traced) 10/18/2021 2.5  cm^2 Final    LVLd ap4 10/18/2021 6.5  cm Final    EDV(MOD-sp4) 10/18/2021 54.6  ml Final    LVLs ap4 10/18/2021 5.1  cm Final    ESV(MOD-sp4) 10/18/2021 15.7  ml Final    EF(MOD-sp4) 10/18/2021 71.2  % Final    SV(MOD-sp4) 10/18/2021 38.9  ml Final    SI(MOD-sp4) 10/18/2021 22.9  ml/m^2 Final    Ao root area (BSA corrected) 10/18/2021 1.7   Final    LV Rojas Vol (BSA corrected) 10/18/2021 32.2  ml/m^2 Final    LV Sys Vol (BSA corrected) 10/18/2021 9.2  ml/m^2 Final    MV E max claudy 10/18/2021 110.0  cm/sec Final    MV A max claudy 10/18/2021 89.7  cm/sec Final    MV E/A 10/18/2021 1.2   Final    MV P1/2t max claudy 10/18/2021 110.0  cm/sec Final    MV P1/2t 10/18/2021 69.3  msec Final    MVA(P1/2t) 10/18/2021 3.2  cm^2 Final    MV dec slope 10/18/2021 465.0  cm/sec^2 Final    Ao pk claudy 10/18/2021 142.0  cm/sec Final    Ao max PG 10/18/2021 8.1  mmHg Final    Ao max PG (full) 10/18/2021 3.5  mmHg Final    Ao V2 mean 10/18/2021 90.3  cm/sec Final    Ao mean PG 10/18/2021 4.0  mmHg Final    Ao mean PG (full) 10/18/2021 2.0  mmHg Final    Ao V2 VTI 10/18/2021 33.6  cm Final    SHAILA(I,A) 10/18/2021 1.7  cm^2 Final    SHAILA(I,D) 10/18/2021 1.7  cm^2 Final    SHAILA(V,A) 10/18/2021 1.9  cm^2 Final    SHAILA(V,D) 10/18/2021 1.9  cm^2 Final    LV V1 max PG 10/18/2021 4.6  mmHg Final    LV V1 mean PG 10/18/2021 2.0  mmHg Final    LV V1 max 10/18/2021 107.0  cm/sec Final    LV V1 mean 10/18/2021 64.5  cm/sec Final    LV V1 VTI 10/18/2021 22.9  cm Final    SV(Ao) 10/18/2021 221.9  ml Final    SI(Ao) 10/18/2021 130.7  ml/m^2 Final    SV(LVOT) 10/18/2021 58.3  ml Final    SI(LVOT) 10/18/2021 34.3  ml/m^2 Final    PA acc time 10/18/2021 0.14  sec Final    TR max claudy 10/18/2021 241.0  cm/sec Final    PA pr(Accel) 10/18/2021 17.4  mmHg Final    MVA P1/2T LCG 10/18/2021 2.0  cm^2 Final    BH CV ECHO FLAKO - BZI_BMI 10/18/2021 22.0  kilograms/m^2  Final     CV ECHO FLAKO - BSA([facility]) 10/18/2021 1.7  m^2 Final     CV ECHO FLAKO - BZI_METRIC_WEIGHT 10/18/2021 61.7  kg Final     CV ECHO FLAKO - BZI_METRIC_HEIGHT 10/18/2021 167.6  cm Final    Target HR (85%) 10/18/2021 120  bpm Final    Max. Pred. HR (100%) 10/18/2021 141  bpm Final        Procedure: None  Procedures     I have reviewed and agree with the above PMH, PSH, FMH, social history, medications, allergies, and labs.     Assessment/Plan:   Problem List Items Addressed This Visit          Genitourinary and Reproductive     Bilateral kidney stones - Primary    Relevant Medications    tamsulosin (Flomax) 0.4 MG capsule 24 hr capsule    Hydronephrosis with ureteropelvic junction (UPJ) obstruction    Atrophy of left kidney    Bladder wall thickening    Relevant Medications    tamsulosin (Flomax) 0.4 MG capsule 24 hr capsule       Health Maintenance:   Health Maintenance Due   Topic Date Due    DXA SCAN  Never done    ZOSTER VACCINE (1 of 2) Never done    Pneumococcal Vaccine 65+ (1 - PCV) Never done    ANNUAL WELLNESS VISIT  Never done    COVID-19 Vaccine (6 - Pfizer series) 04/14/2023        Smoking Counseling: Former smoker.  Never used smokeless tobacco.    Urine Incontinence: Patient reports that she is not currently experiencing any symptoms of urinary incontinence.    Patient was given instructions and counseling regarding her condition or for health maintenance advice. Please see specific information pulled into the AVS if appropriate.    Patient Education:   Renal calculus - It was discussed with the patient the presence of a bilateral nonobstructing intrarenal calculi.  We discussed the various therapeutic options available including percutaneous nephrostolithotomy, ureteroscopy and extracorporeal shockwave  lithotripsy.  We discussed the risks of lithotripsy including the passage of stones leading to a 3% chance of Steinstrasse or a large string of stones in the distal ureter. In this incidence  the patient was informed that a ureteroscopy is indicated for obstructing fragments.  Patient was informed of an extremely rare incidence of renal hematoma and the significance of this.  Patient was educated on percutaneous nephrostolithotomy and its use as well as the risks and benefits such as the need for postoperative hospitalization, and the risk of damage to the kidney and the remote risk of a nephrectomy.  We also discussed the use of ureteroscopy in the upper tracts and its decreased success rate to completely remove the stones likely causing stent placement leading to an additional procedure for removal.  We discussed the absolute relative indicators for intervention including the presence of sepsis and uncontrollable pain leading to need for urgent intervention.  We discussed placement of a stent if indicated and the management of the stent as well.  Did reassure patient that all kidney stones are passable.  Advised her that given her left-sided hydronephrosis and possible concern for UPJ obstruction/stenosis she does have an increased risk of inability to pass left-sided renal stones.  I will give her a prescription of Flomax to take as needed if she feels she is passing a kidney stone.  They advised her to call if she wishes to undergo surgery for any stone removal or has increasing pain.  Patient verbalized understanding.  Bladder wall thickening -I discussed with the patient the pathophysiology of bladder wall thickening and causes which can include but not limited to interstitial cystitis, acute cystitis, urinary tract infection, bladder carcinoma, distention, or other urological abnormalities.  Advised patient that given her social history of smoking in the past she has an increased risk for bladder carcinoma.  Did discuss the use of a cystoscopy in office for lower tract investigation rule out of any other causes of microscopic hematuria.  She has not had any gross hematuria.  Patient would like to  hold off at this time.  Hydronephrosis/atrophy of left kidney -did discuss with the patient her most recent CT results revealing hydronephrosis and chronic atrophy of the left kidney.  Did discuss forms of treatment which can include further studies such as nuclear medicine versus surgical interventions with cystoscopy and retrograde pyelogram.  Advised patient that given with significant atrophy of left kidney she most likely has a current nonfunctioning left kidney.  Did discuss the use of a nuclear medicine study with Lasix to identify functioning of left kidney.  Also discussed the use of a cystoscopy with left-sided retrograde pyelogram to identify any form of obstruction.  Advised her that if there is significant stricture or obstruction noted she will need a stent placed to help with drainage of kidney.  Did advise patient that if kidney is nonfunctioning stent placement will have minimal improvement in swelling or kidney function.  This time I would recommend to complete a nuclear medicine study soon as possible.  Patient would like to receive a second opinion from her previous urologist.  I will give her a copy of her CT report advised her to get a copy of the disc.  If patient is desirous to undergo surgery or repeat study here I will schedule this appropriately per her request.  I will see her back on an as-needed basis per patient request.    Visit Diagnoses:    ICD-10-CM ICD-9-CM   1. Bilateral kidney stones  N20.0 592.0   2. Atrophy of left kidney  N26.1 587   3. Hydronephrosis with ureteropelvic junction (UPJ) obstruction  Q62.11 591   4. Bladder wall thickening  N32.89 596.89       Meds Ordered During Visit:  New Medications Ordered This Visit   Medications    tamsulosin (Flomax) 0.4 MG capsule 24 hr capsule     Sig: Take 1 capsule by mouth Every Night.     Dispense:  30 capsule     Refill:  1       Follow Up Appointment: Pending patient's request  No follow-ups on file.      This document has been  electronically signed by Hunter Johnson PA-C   August 16, 2023 10:09 EDT    Part of this note may be an electronic transcription/translation of spoken language to printed text using the Dragon Dictation System.

## 2023-08-22 ENCOUNTER — HOSPITAL ENCOUNTER (OUTPATIENT)
Dept: MAMMOGRAPHY | Facility: HOSPITAL | Age: 83
Discharge: HOME | End: 2023-08-22
Admitting: RADIOLOGY
Payer: MEDICARE

## 2023-08-22 DIAGNOSIS — R92.8 ABNORMAL MAMMOGRAM: ICD-10-CM

## 2023-08-22 PROCEDURE — G0279 TOMOSYNTHESIS, MAMMO: HCPCS

## 2023-08-22 PROCEDURE — 77065 DX MAMMO INCL CAD UNI: CPT

## 2023-08-25 ENCOUNTER — OFFICE VISIT (OUTPATIENT)
Dept: UROLOGY | Facility: CLINIC | Age: 83
End: 2023-08-25
Payer: MEDICARE

## 2023-08-25 VITALS
WEIGHT: 123 LBS | SYSTOLIC BLOOD PRESSURE: 126 MMHG | DIASTOLIC BLOOD PRESSURE: 68 MMHG | OXYGEN SATURATION: 96 % | HEIGHT: 66 IN | HEART RATE: 87 BPM | BODY MASS INDEX: 19.77 KG/M2

## 2023-08-25 DIAGNOSIS — N39.0 RECURRENT UTI: Primary | ICD-10-CM

## 2023-08-25 LAB
BILIRUB BLD-MCNC: NEGATIVE MG/DL
CLARITY, POC: CLEAR
COLOR UR: YELLOW
EXPIRATION DATE: ABNORMAL
GLUCOSE UR STRIP-MCNC: NEGATIVE MG/DL
KETONES UR QL: NEGATIVE
LEUKOCYTE EST, POC: ABNORMAL
Lab: ABNORMAL
NITRITE UR-MCNC: NEGATIVE MG/ML
PH UR: 6 [PH] (ref 5–8)
PROT UR STRIP-MCNC: ABNORMAL MG/DL
RBC # UR STRIP: NEGATIVE /UL
SP GR UR: 1.02 (ref 1–1.03)
UROBILINOGEN UR QL: NORMAL

## 2023-08-25 RX ORDER — ESTRADIOL 0.1 MG/G
CREAM VAGINAL
Qty: 42.5 G | Refills: 3 | Status: SHIPPED | OUTPATIENT
Start: 2023-08-25

## 2023-08-25 NOTE — PROGRESS NOTES
Office Note Kidney Stone      Patient Name: Pamela Acosta  : 1940   MRN: [id number]     Chief Complaint: History of Kidney Stones.    Chief Complaint   Patient presents with    Bilateral kidney stones       Referring Provider: No ref. provider found    History of Present Illness: Pamela Acosta is a 83 y.o. female who presents today for history of Kidney Stones.  She has a history of bilateral nephrolithiasis.  In  she underwent ESWL for a left renal stone.    No gross hematuria or dysuria.  She recently had a UTI and was treated with Rocephin.  She has had 2 UTI this past year.    No inciting event.    No history of breast cancer.     No incontinence.      Prior stone prevention medications:     Stone related history  Family history of stones:   no  Renal disease or anatomic abnormality: yes  Malabsorptive disease or gastric bypass: no  Frequent UTI's    no  Parathyroid disease    no      Daily Fluid Intake  She drinks 2 bottles of water and tea.       Prior kidney stone surgeries? ESWL in       Subjective      Review of System:   Review of Systems   Constitutional: Negative.    HENT: Negative.     Eyes: Negative.    Respiratory: Negative.     Cardiovascular: Negative.    Gastrointestinal:  Positive for constipation.   Endocrine: Negative.    Genitourinary: Negative.    Musculoskeletal: Negative.    Skin: Negative.    Allergic/Immunologic: Negative.    Neurological: Negative.    Hematological: Negative.    Psychiatric/Behavioral: Negative.      I have reviewed the ROS documented by my clinical staff, I have updated appropriately and I agree. Zach Graham MD    Past Medical History:   Past Medical History:   Diagnosis Date    Arthritis     Frequent headaches     Hyperlipidemia     Hypertension     Myocardial infarction     Thyroid disease        Past Surgical History:   Past Surgical History:   Procedure Laterality Date    BREAST BIOPSY Right     benign    HYSTERECTOMY       benign       Family History:   Family History   Problem Relation Age of Onset    Heart disease Father     Heart attack Father     Heart disease Brother     Breast cancer Neg Hx        Social History:   Social History     Socioeconomic History    Marital status:    Tobacco Use    Smoking status: Former     Passive exposure: Past    Smokeless tobacco: Never   Vaping Use    Vaping Use: Never used   Substance and Sexual Activity    Alcohol use: Yes     Alcohol/week: 3.0 - 6.0 standard drinks     Types: 1 - 2 Glasses of wine, 1 - 2 Cans of beer, 1 - 2 Shots of liquor per week     Comment: OCCASIONALLY    Drug use: Never    Sexual activity: Never       Medications:     Current Outpatient Medications:     amLODIPine (NORVASC) 5 MG tablet, Take 1 tablet by mouth Daily., Disp: , Rfl:     aspirin 81 MG EC tablet, Take 1 tablet by mouth Daily., Disp: , Rfl:     cilostazol (PLETAL) 50 MG tablet, Take 1 tablet by mouth 2 (Two) Times a Day., Disp: 180 tablet, Rfl: 3    doxycycline (MONODOX) 100 MG capsule, 1 capsule., Disp: , Rfl:     estradiol (ESTRACE) 0.5 MG tablet, Take 1 tablet by mouth Daily., Disp: , Rfl:     hydrALAZINE (APRESOLINE) 25 MG tablet, Take 1 tablet by mouth Daily., Disp: , Rfl:     levothyroxine (SYNTHROID, LEVOTHROID) 88 MCG tablet, Take 1 tablet by mouth Daily., Disp: , Rfl:     pravastatin (PRAVACHOL) 80 MG tablet, Take 1 tablet by mouth Daily., Disp: , Rfl:     tamsulosin (Flomax) 0.4 MG capsule 24 hr capsule, Take 1 capsule by mouth Every Night., Disp: 30 capsule, Rfl: 1    traZODone (DESYREL) 100 MG tablet, Take 1 tablet by mouth Daily., Disp: , Rfl:     estradiol (ESTRACE) 0.1 MG/GM vaginal cream, Apply a pea sized amount to the vagina 3 times/week, Disp: 42.5 g, Rfl: 3    Allergies:   No Known Allergies    IPSS Questionnaire (AUA-7):  Bladder & Bowel Symptom Questionnaire    How often do you usually urinate during the day ?   1 - About every 3-4 hours   2.   How many timed do you urinate at  "night?   2 - About every 2-3 hours    3.   What is the reason that you usually urinate?   1 - About every 3-4 hours   4.   Once you get the urge to go, how long can you     comfortably delay?   2 - About every 2-3 hours    5.   How often do you get a sudden urge that makes you rush to the bathroom?   1 - About every 3-4 hours   6.   How often does a sudden urge to urinate result in you leaking urine or wetting pads?   2 - About every 2-3 hours    7.  In your opinion, how good is your bladder control?   1 - About every 3-4 hours   8.  Do you have accidental bowel leakage?   no   9.  Do you have difficulty fully emptying your bladder?   no   10.  Do you experience accidental leakage when performing some physical activity such as coughing, sneezing, laughing or exercise?   no   11. Have you tried medications to help improve your symptoms?   no   12. Would you be interested in learning about a long-lasting option that may help you with your symptoms?   no                                                                             Total Score   10       Objective     Physical Exam:   Vital Signs:   Vitals:    08/25/23 1200   BP: 126/68   Pulse: 87   SpO2: 96%   Weight: 55.8 kg (123 lb)   Height: 167.6 cm (65.98\")   PainSc: 0-No pain     Body mass index is 19.86 kg/mý.     Physical Exam  Vitals and nursing note reviewed. Exam conducted with a chaperone present.   Constitutional:       General: She is awake. She is not in acute distress.     Appearance: Normal appearance.   HENT:      Head: Normocephalic and atraumatic.      Right Ear: External ear normal.      Left Ear: External ear normal.      Nose: Nose normal.   Eyes:      Conjunctiva/sclera: Conjunctivae normal.   Pulmonary:      Effort: Pulmonary effort is normal. No respiratory distress.   Abdominal:      General: Abdomen is flat. There is no distension.      Palpations: Abdomen is soft. There is no mass.      Tenderness: There is no abdominal tenderness. There is " no right CVA tenderness, left CVA tenderness, guarding or rebound.      Hernia: No hernia is present.   Genitourinary:     Exam position: Lithotomy position.   Skin:     General: Skin is warm.   Neurological:      General: No focal deficit present.      Mental Status: She is alert and oriented to person, place, and time.      Gait: Gait normal.   Psychiatric:         Behavior: Behavior normal. Behavior is cooperative.         Thought Content: Thought content normal.         Judgment: Judgment normal.     Labs:   Brief Urine Lab Results       None                 Color, UA   Date Value Ref Range Status   05/14/2015 Yellow YELLOW,STRAW,COLORLESS Final     Bilirubin, UA   Date Value Ref Range Status   05/14/2015 Negative NEGATIVE Final     Urobilinogen, UA   Date Value Ref Range Status   05/14/2015 0.2 0.2 - 1.0 E.U./dL Final     Leukocytes, UA   Date Value Ref Range Status   05/14/2015 Large (A) NEGATIVE Final       Lab Results   Component Value Date    GLUCOSE 91 07/22/2015    CALCIUM 9.4 07/22/2015     07/22/2015    K 4.3 07/22/2015    CO2 28.0 07/22/2015     07/22/2015    BUN 15 07/22/2015    CREATININE 1.12 07/22/2015    BCR 13.8 07/22/2015    ANIONGAP 8.0 07/22/2015       Lab Results   Component Value Date    WBC 8.9 06/08/2015    HGB 14.1 06/08/2015    HCT 45.8 06/08/2015    MCV 90.0 06/08/2015     06/08/2015       Stone Analysis  No components found for: [id number], [id number], [id number]    Images:   CT Abdomen Pelvis Stone Protocol    Result Date: 8/14/2023  1.  Bilateral nonobstructing kidney stones. 2.  Chronic appearing atrophy left kidney with probable chronic obstructive uropathy noted to the level of the UPJ which may be due to UPJ stenosis/stricture. 3.  Urinary bladder wall thickening which may be in part related to incomplete distention. Cystitis not excluded. 4.  Diverticulosis without diverticulitis. 5.  Other incidental/nonacute findings detailed above.  This report was  finalized on 8/14/2023 4:52 PM by Dr. Aman Abreu MD.      Mammo Diagnostic Digital Tomosynthesis Right With CAD    Result Date: 8/22/2023  Benign right mammographic findings.   BI-RADS CATEGORY:  2, BENIGN   RECOMMENDATION:  Recommend the patient resume routine annual screening mammography after 11/20/2023.  CAD was utilized.  The standard false-negative rate of mammography is between 10% and 25%. Complex patterns or increased breast density will markedly elevate the false-negative rate of mammography.    The patient was notified of the results and recommendations at the time of her visit.  Additionally, a letter, in lay terminology, with the results of this exam will be mailed to the patient.  This report was finalized on 8/22/2023 1:22 PM by Dr. Kyra Lieberman MD.        Measures:   Tobacco:   Pamela Acosta  reports that she has quit smoking. She has been exposed to tobacco smoke. She has never used smokeless tobacco..     Urine Incontinence: Patient reports that she is not currently experiencing any symptoms of urinary incontinence.         Assessment / Plan      Assessment/Plan:   Mr. Acosta is a 83 y.o. male who presented today for kidney stones.  She has a atrophic left kidney.  She has no pain or issues with the left kidney.  Her Cr is slightly elevated but stable.  Her atrophy relatively stable since 2015.    For her UTI we will start her on low dose topical vaginal estrace cream.  WE discussed monitoring her Cr on a regular basis.  I will see her back in 1 year or sooner if necessary.      Diagnoses and all orders for this visit:    1. Recurrent UTI (Primary)  -     estradiol (ESTRACE) 0.1 MG/GM vaginal cream; Apply a pea sized amount to the vagina 3 times/week  Dispense: 42.5 g; Refill: 3  -     POC Urinalysis Dipstick, Automated  -     Urine Culture - Urine, Urine, Clean Catch    Other orders  -     SCANNED - IMAGING        Patient Education:   1. Fluid intake goal ~ 2.5L per day.  2.   Maintain a normal dietary calcium intake.  Take any calcium supplements with food.     Follow Up:   Return in about 1 year (around 8/25/2024) for Recheck.        Zach Graham MD  [facility] [place]

## 2023-08-27 LAB
BACTERIA UR CULT: NO GROWTH
BACTERIA UR CULT: NORMAL
Lab: NORMAL

## 2023-10-13 DIAGNOSIS — N20.0 BILATERAL KIDNEY STONES: ICD-10-CM

## 2023-10-13 RX ORDER — TAMSULOSIN HYDROCHLORIDE 0.4 MG/1
1 CAPSULE ORAL NIGHTLY
Qty: 60 CAPSULE | Refills: 10 | Status: SHIPPED | OUTPATIENT
Start: 2023-10-13

## 2023-11-20 ENCOUNTER — TRANSCRIBE ORDERS (OUTPATIENT)
Dept: ADMINISTRATIVE | Facility: HOSPITAL | Age: 83
End: 2023-11-20
Payer: MEDICARE

## 2023-11-20 ENCOUNTER — HOSPITAL ENCOUNTER (OUTPATIENT)
Dept: GENERAL RADIOLOGY | Facility: HOSPITAL | Age: 83
Discharge: HOME | End: 2023-11-20
Admitting: INTERNAL MEDICINE
Payer: MEDICARE

## 2023-11-20 DIAGNOSIS — M25.562 LEFT KNEE PAIN, UNSPECIFIED CHRONICITY: ICD-10-CM

## 2023-11-20 DIAGNOSIS — M25.561 RIGHT KNEE PAIN, UNSPECIFIED CHRONICITY: Primary | ICD-10-CM

## 2023-11-20 DIAGNOSIS — M25.561 RIGHT KNEE PAIN, UNSPECIFIED CHRONICITY: ICD-10-CM

## 2023-11-20 PROCEDURE — 73565 X-RAY EXAM OF KNEES: CPT

## 2024-07-15 ENCOUNTER — TRANSCRIBE ORDERS (OUTPATIENT)
Dept: ADMINISTRATIVE | Facility: HOSPITAL | Age: 84
End: 2024-07-15
Payer: MEDICARE

## 2024-07-15 DIAGNOSIS — Z12.31 SCREENING MAMMOGRAM, ENCOUNTER FOR: Primary | ICD-10-CM

## 2024-08-23 ENCOUNTER — HOSPITAL ENCOUNTER (OUTPATIENT)
Dept: MAMMOGRAPHY | Facility: HOSPITAL | Age: 84
Discharge: HOME | End: 2024-08-23
Admitting: INTERNAL MEDICINE
Payer: MEDICARE

## 2024-08-23 DIAGNOSIS — Z12.31 SCREENING MAMMOGRAM, ENCOUNTER FOR: ICD-10-CM

## 2024-08-23 PROCEDURE — 77067 SCR MAMMO BI INCL CAD: CPT

## 2024-08-23 PROCEDURE — 77063 BREAST TOMOSYNTHESIS BI: CPT

## 2025-02-13 ENCOUNTER — TRANSCRIBE ORDERS (OUTPATIENT)
Dept: ADMINISTRATIVE | Facility: HOSPITAL | Age: 85
End: 2025-02-13
Payer: MEDICARE

## 2025-02-13 DIAGNOSIS — R05.3 CHRONIC COUGH: Primary | ICD-10-CM

## 2025-02-28 ENCOUNTER — HOSPITAL ENCOUNTER (OUTPATIENT)
Dept: CT IMAGING | Facility: HOSPITAL | Age: 85
Discharge: HOME | End: 2025-02-28
Payer: MEDICARE

## 2025-02-28 DIAGNOSIS — R05.3 CHRONIC COUGH: ICD-10-CM

## 2025-02-28 PROCEDURE — 71250 CT THORAX DX C-: CPT

## 2025-08-04 ENCOUNTER — TRANSCRIBE ORDERS (OUTPATIENT)
Dept: ADMINISTRATIVE | Facility: HOSPITAL | Age: 85
End: 2025-08-04
Payer: MEDICARE

## 2025-08-04 DIAGNOSIS — Z12.31 VISIT FOR SCREENING MAMMOGRAM: Primary | ICD-10-CM

## 2025-08-11 ENCOUNTER — OFFICE VISIT (OUTPATIENT)
Dept: CARDIOLOGY | Facility: CLINIC | Age: 85
End: 2025-08-11
Payer: MEDICARE

## 2025-08-11 VITALS
BODY MASS INDEX: 19.93 KG/M2 | HEIGHT: 66 IN | HEART RATE: 80 BPM | DIASTOLIC BLOOD PRESSURE: 70 MMHG | WEIGHT: 124 LBS | OXYGEN SATURATION: 99 % | SYSTOLIC BLOOD PRESSURE: 135 MMHG

## 2025-08-11 DIAGNOSIS — M79.605 PAIN IN BOTH LOWER EXTREMITIES: Primary | ICD-10-CM

## 2025-08-11 DIAGNOSIS — N26.1 ATROPHY OF LEFT KIDNEY: ICD-10-CM

## 2025-08-11 DIAGNOSIS — I25.10 ASCVD (ARTERIOSCLEROTIC CARDIOVASCULAR DISEASE): ICD-10-CM

## 2025-08-11 DIAGNOSIS — M79.604 PAIN IN BOTH LOWER EXTREMITIES: Primary | ICD-10-CM

## 2025-08-11 PROCEDURE — 3078F DIAST BP <80 MM HG: CPT | Performed by: NURSE PRACTITIONER

## 2025-08-11 PROCEDURE — 3075F SYST BP GE 130 - 139MM HG: CPT | Performed by: NURSE PRACTITIONER

## 2025-08-11 PROCEDURE — 99204 OFFICE O/P NEW MOD 45 MIN: CPT | Performed by: NURSE PRACTITIONER

## 2025-08-11 PROCEDURE — 1159F MED LIST DOCD IN RCRD: CPT | Performed by: NURSE PRACTITIONER

## 2025-08-11 PROCEDURE — 1160F RVW MEDS BY RX/DR IN RCRD: CPT | Performed by: NURSE PRACTITIONER

## 2025-08-11 RX ORDER — IBUPROFEN 800 MG/1
TABLET, FILM COATED ORAL
COMMUNITY
Start: 2025-08-07

## 2025-08-11 RX ORDER — AMOXICILLIN 875 MG/1
TABLET, COATED ORAL
COMMUNITY
Start: 2025-08-07

## 2025-08-19 ENCOUNTER — PATIENT ROUNDING (BHMG ONLY) (OUTPATIENT)
Dept: CARDIOLOGY | Facility: CLINIC | Age: 85
End: 2025-08-19
Payer: MEDICARE

## 2025-08-20 ENCOUNTER — LAB (OUTPATIENT)
Dept: FAMILY MEDICINE CLINIC | Facility: CLINIC | Age: 85
End: 2025-08-20
Payer: MEDICARE

## 2025-08-20 DIAGNOSIS — I77.1 ARTERIAL OCCLUSION DUE TO STENOSIS: ICD-10-CM

## 2025-08-20 DIAGNOSIS — G47.00 INSOMNIA, UNSPECIFIED TYPE: ICD-10-CM

## 2025-08-20 DIAGNOSIS — E78.2 MIXED HYPERLIPIDEMIA: ICD-10-CM

## 2025-08-20 DIAGNOSIS — E03.9 HYPOTHYROIDISM, UNSPECIFIED TYPE: ICD-10-CM

## 2025-08-20 DIAGNOSIS — I25.10 ASCVD (ARTERIOSCLEROTIC CARDIOVASCULAR DISEASE): ICD-10-CM

## 2025-08-20 DIAGNOSIS — N18.30 STAGE 3 CHRONIC KIDNEY DISEASE, UNSPECIFIED WHETHER STAGE 3A OR 3B CKD: ICD-10-CM

## 2025-08-20 DIAGNOSIS — Z78.0 MENOPAUSE: ICD-10-CM

## 2025-08-20 DIAGNOSIS — I10 ESSENTIAL HYPERTENSION: ICD-10-CM

## 2025-08-20 LAB
25(OH)D3 SERPL-MCNC: 32.6 NG/ML (ref 30–100)
ALBUMIN SERPL-MCNC: 4.4 G/DL (ref 3.5–5.2)
ALBUMIN/GLOB SERPL: 1.8 G/DL
ALP SERPL-CCNC: 63 U/L (ref 39–117)
ALT SERPL W P-5'-P-CCNC: 7 U/L (ref 1–33)
ANION GAP SERPL CALCULATED.3IONS-SCNC: 13 MMOL/L (ref 5–15)
AST SERPL-CCNC: 19 U/L (ref 1–32)
BASOPHILS # BLD AUTO: 0.05 10*3/MM3 (ref 0–0.2)
BASOPHILS NFR BLD AUTO: 0.8 % (ref 0–1.5)
BILIRUB SERPL-MCNC: 0.5 MG/DL (ref 0–1.2)
BUN SERPL-MCNC: 13 MG/DL (ref 8–23)
BUN/CREAT SERPL: 12 (ref 7–25)
CALCIUM SPEC-SCNC: 9.3 MG/DL (ref 8.6–10.5)
CHLORIDE SERPL-SCNC: 105 MMOL/L (ref 98–107)
CHOLEST SERPL-MCNC: 180 MG/DL (ref 0–200)
CO2 SERPL-SCNC: 25 MMOL/L (ref 22–29)
CREAT SERPL-MCNC: 1.08 MG/DL (ref 0.57–1)
DEPRECATED RDW RBC AUTO: 41.7 FL (ref 37–54)
EGFRCR SERPLBLD CKD-EPI 2021: 51.1 ML/MIN/1.73
EOSINOPHIL # BLD AUTO: 0.08 10*3/MM3 (ref 0–0.4)
EOSINOPHIL NFR BLD AUTO: 1.4 % (ref 0.3–6.2)
ERYTHROCYTE [DISTWIDTH] IN BLOOD BY AUTOMATED COUNT: 12.8 % (ref 12.3–15.4)
FERRITIN SERPL-MCNC: 54.1 NG/ML (ref 13–150)
GLOBULIN UR ELPH-MCNC: 2.4 GM/DL
GLUCOSE SERPL-MCNC: 89 MG/DL (ref 65–99)
HCT VFR BLD AUTO: 47 % (ref 34–46.6)
HDLC SERPL-MCNC: 94 MG/DL (ref 40–60)
HGB BLD-MCNC: 15.1 G/DL (ref 12–15.9)
IMM GRANULOCYTES # BLD AUTO: 0.01 10*3/MM3 (ref 0–0.05)
IMM GRANULOCYTES NFR BLD AUTO: 0.2 % (ref 0–0.5)
IRON 24H UR-MRATE: 101 MCG/DL (ref 37–145)
IRON SATN MFR SERPL: 24 % (ref 20–50)
LDLC SERPL CALC-MCNC: 75 MG/DL (ref 0–100)
LDLC/HDLC SERPL: 0.8 {RATIO}
LYMPHOCYTES # BLD AUTO: 1.38 10*3/MM3 (ref 0.7–3.1)
LYMPHOCYTES NFR BLD AUTO: 23.4 % (ref 19.6–45.3)
MAGNESIUM SERPL-MCNC: 2.3 MG/DL (ref 1.6–2.4)
MCH RBC QN AUTO: 28.5 PG (ref 26.6–33)
MCHC RBC AUTO-ENTMCNC: 32.1 G/DL (ref 31.5–35.7)
MCV RBC AUTO: 88.8 FL (ref 79–97)
MONOCYTES # BLD AUTO: 0.6 10*3/MM3 (ref 0.1–0.9)
MONOCYTES NFR BLD AUTO: 10.2 % (ref 5–12)
NEUTROPHILS NFR BLD AUTO: 3.78 10*3/MM3 (ref 1.7–7)
NEUTROPHILS NFR BLD AUTO: 64 % (ref 42.7–76)
NRBC BLD AUTO-RTO: 0 /100 WBC (ref 0–0.2)
PLATELET # BLD AUTO: 173 10*3/MM3 (ref 140–450)
PMV BLD AUTO: 10.2 FL (ref 6–12)
POTASSIUM SERPL-SCNC: 4.6 MMOL/L (ref 3.5–5.2)
PROT SERPL-MCNC: 6.8 G/DL (ref 6–8.5)
RBC # BLD AUTO: 5.29 10*6/MM3 (ref 3.77–5.28)
SODIUM SERPL-SCNC: 143 MMOL/L (ref 136–145)
T4 FREE SERPL-MCNC: 1.9 NG/DL (ref 0.92–1.68)
TIBC SERPL-MCNC: 416 MCG/DL (ref 298–536)
TRANSFERRIN SERPL-MCNC: 279 MG/DL (ref 200–360)
TRIGL SERPL-MCNC: 55 MG/DL (ref 0–150)
TSH SERPL DL<=0.05 MIU/L-ACNC: 0.27 UIU/ML (ref 0.27–4.2)
VIT B12 BLD-MCNC: 346 PG/ML (ref 211–946)
VLDLC SERPL-MCNC: 11 MG/DL (ref 5–40)
WBC NRBC COR # BLD AUTO: 5.9 10*3/MM3 (ref 3.4–10.8)

## 2025-08-20 PROCEDURE — 82607 VITAMIN B-12: CPT | Performed by: FAMILY MEDICINE

## 2025-08-20 PROCEDURE — 84439 ASSAY OF FREE THYROXINE: CPT | Performed by: FAMILY MEDICINE

## 2025-08-20 PROCEDURE — 84443 ASSAY THYROID STIM HORMONE: CPT | Performed by: FAMILY MEDICINE

## 2025-08-20 PROCEDURE — 36415 COLL VENOUS BLD VENIPUNCTURE: CPT

## 2025-08-20 PROCEDURE — 85025 COMPLETE CBC W/AUTO DIFF WBC: CPT | Performed by: FAMILY MEDICINE

## 2025-08-20 PROCEDURE — 82728 ASSAY OF FERRITIN: CPT | Performed by: FAMILY MEDICINE

## 2025-08-20 PROCEDURE — 82306 VITAMIN D 25 HYDROXY: CPT | Performed by: FAMILY MEDICINE

## 2025-08-20 PROCEDURE — 83735 ASSAY OF MAGNESIUM: CPT | Performed by: FAMILY MEDICINE

## 2025-08-20 PROCEDURE — 80053 COMPREHEN METABOLIC PANEL: CPT | Performed by: FAMILY MEDICINE

## 2025-08-20 PROCEDURE — 83540 ASSAY OF IRON: CPT | Performed by: FAMILY MEDICINE

## 2025-08-20 PROCEDURE — 80061 LIPID PANEL: CPT | Performed by: FAMILY MEDICINE

## 2025-08-20 PROCEDURE — 84466 ASSAY OF TRANSFERRIN: CPT | Performed by: FAMILY MEDICINE

## 2025-08-26 ENCOUNTER — HOSPITAL ENCOUNTER (OUTPATIENT)
Dept: MAMMOGRAPHY | Facility: HOSPITAL | Age: 85
Discharge: HOME | End: 2025-08-26
Admitting: INTERNAL MEDICINE
Payer: MEDICARE

## 2025-08-26 DIAGNOSIS — Z12.31 VISIT FOR SCREENING MAMMOGRAM: ICD-10-CM

## 2025-08-26 PROCEDURE — 77067 SCR MAMMO BI INCL CAD: CPT

## 2025-08-26 PROCEDURE — 77063 BREAST TOMOSYNTHESIS BI: CPT
